# Patient Record
Sex: FEMALE | Race: OTHER | HISPANIC OR LATINO | Employment: FULL TIME | URBAN - METROPOLITAN AREA
[De-identification: names, ages, dates, MRNs, and addresses within clinical notes are randomized per-mention and may not be internally consistent; named-entity substitution may affect disease eponyms.]

---

## 2018-03-15 ENCOUNTER — OFFICE VISIT (OUTPATIENT)
Dept: OBSTETRICS AND GYNECOLOGY | Facility: CLINIC | Age: 23
End: 2018-03-15
Payer: COMMERCIAL

## 2018-03-15 ENCOUNTER — PATIENT MESSAGE (OUTPATIENT)
Dept: OBSTETRICS AND GYNECOLOGY | Facility: CLINIC | Age: 23
End: 2018-03-15

## 2018-03-15 VITALS
DIASTOLIC BLOOD PRESSURE: 62 MMHG | BODY MASS INDEX: 21.62 KG/M2 | SYSTOLIC BLOOD PRESSURE: 98 MMHG | WEIGHT: 114.5 LBS | HEIGHT: 61 IN

## 2018-03-15 DIAGNOSIS — Z11.3 SCREENING EXAMINATION FOR STD (SEXUALLY TRANSMITTED DISEASE): ICD-10-CM

## 2018-03-15 DIAGNOSIS — Z30.9 ENCOUNTER FOR CONTRACEPTIVE MANAGEMENT, UNSPECIFIED TYPE: Primary | ICD-10-CM

## 2018-03-15 DIAGNOSIS — Z12.4 ENCOUNTER FOR PAPANICOLAOU SMEAR FOR CERVICAL CANCER SCREENING: ICD-10-CM

## 2018-03-15 DIAGNOSIS — N63.0 BREAST LUMP IN FEMALE: ICD-10-CM

## 2018-03-15 DIAGNOSIS — Z01.419 WELL WOMAN EXAM: ICD-10-CM

## 2018-03-15 LAB
B-HCG UR QL: NEGATIVE
CTP QC/QA: YES

## 2018-03-15 PROCEDURE — 88175 CYTOPATH C/V AUTO FLUID REDO: CPT | Performed by: PATHOLOGY

## 2018-03-15 PROCEDURE — 87624 HPV HI-RISK TYP POOLED RSLT: CPT

## 2018-03-15 PROCEDURE — 88141 CYTOPATH C/V INTERPRET: CPT | Mod: ,,, | Performed by: PATHOLOGY

## 2018-03-15 PROCEDURE — 87491 CHLMYD TRACH DNA AMP PROBE: CPT

## 2018-03-15 PROCEDURE — 99999 PR PBB SHADOW E&M-NEW PATIENT-LVL III: CPT | Mod: PBBFAC,,, | Performed by: STUDENT IN AN ORGANIZED HEALTH CARE EDUCATION/TRAINING PROGRAM

## 2018-03-15 PROCEDURE — 99385 PREV VISIT NEW AGE 18-39: CPT | Mod: S$GLB,,, | Performed by: STUDENT IN AN ORGANIZED HEALTH CARE EDUCATION/TRAINING PROGRAM

## 2018-03-15 PROCEDURE — 87480 CANDIDA DNA DIR PROBE: CPT

## 2018-03-15 PROCEDURE — 81025 URINE PREGNANCY TEST: CPT | Mod: S$GLB,,, | Performed by: STUDENT IN AN ORGANIZED HEALTH CARE EDUCATION/TRAINING PROGRAM

## 2018-03-15 NOTE — PROGRESS NOTES
Chief Complaint: Well Woman Exam     HPI:      Juan Carrizales is a 22 y.o.  who presents today for well woman exam.  LMP: Patient's last menstrual period was 2018 (approximate).  No issues, problems, or complaints. Specifically, patient denies abnormal vaginal bleeding, discharge, pelvic pain, urinary problems, or changes in appetite. Ms. Carrizales is currently sexually active with a single male partner. She is currently using no method for contraception. She would like STD screening today (vaginal swab).    Previous Pap: No result found     GYN Hx:  15/R/5 days.  Denies any history of abnormal pap smears or STIs.  Occasional dysmenorrhea relieved with advil.  Gardasil:  Received in .   OB History      Para Term  AB Living    0 0 0 0 0 0    SAB TAB Ectopic Multiple Live Births    0 0 0 0 0        History reviewed. No pertinent past medical history.  History reviewed. No pertinent surgical history.  Social History     Social History    Marital status: Single     Spouse name: N/A    Number of children: N/A    Years of education: N/A     Occupational History    Not on file.     Social History Main Topics    Smoking status: Former Smoker    Smokeless tobacco: Never Used    Alcohol use Yes    Drug use: No    Sexual activity: Yes     Partners: Male     Birth control/ protection: Condom     Other Topics Concern    Not on file     Social History Narrative    No narrative on file     Family History   Problem Relation Age of Onset    Colon cancer Mother     Breast cancer Neg Hx     Ovarian cancer Neg Hx     Mother diagnosed with colon cancer in early 50s    ROS:     GENERAL: Denies unintentional weight gain or weight loss. Feeling well overall.   SKIN: Denies rash or lesions.   HEENT: Denies headaches, or vision changes.   CARDIOVASCULAR: Denies palpitations or chest pain.   RESPIRATORY: Denies shortness of breath or dyspnea on exertion.  BREASTS: Denies pain, lumps, or  "nipple discharge.   ABDOMEN: Denies abdominal pain, constipation, diarrhea, nausea, vomiting, change in appetite.  URINARY: Denies frequency, dysuria, hematuria.  NEUROLOGIC: Denies syncope or weakness.   PSYCHIATRIC: Denies depression, anxiety or mood swings.    Physical Exam:      PHYSICAL EXAM:  BP 98/62   Ht 5' 1" (1.549 m)   Wt 51.9 kg (114 lb 8.5 oz)   LMP 03/09/2018 (Approximate)   BMI 21.64 kg/m²   Body mass index is 21.64 kg/m².     APPEARANCE: Well nourished, well developed, in no acute distress.  PSYCH: Appropriate mood and affect.  SKIN: No acne or hirsutism.  NECK: Neck symmetric without masses or thyromegaly.  NODES: No inguinal, axillary, or supraclavicular lymph node enlargement.  CHEST: Normal respiratory effort.    CARDIOVASCULAR:  Regular rate and rhythm.  LUNGS:  Clear to auscultation bilaterally.  BREASTS: Symmetrical, no skin changes or visible lesions.  R breast with 3 x 2 cm mass at 11 o clock, mobile, mildly TTP.  No other palpable masses or nipple discharge bilaterally.  ABDOMEN: Soft.  No tenderness or masses.    PELVIC: Normal external genitalia without lesions.  Normal hair distribution.  Adequate perineal body, normal urethral meatus.  Vagina moist and well rugated without lesions or discharge.  Cervix pink, without lesions, discharge or tenderness.  No significant cystocele or rectocele.  Bimanual exam shows uterus to be normal size, regular, mobile and nontender.  Adnexa without masses or tenderness.    EXTREMITIES: No edema.  No tenderness to palpation.    Labs:  UPT negative    Assessment/Plan:     22 y.o. G0 here for well woman exam and contraception counseling    Encounter for contraceptive management, unspecified type  -     POCT Urine Pregnancy    Encounter for Papanicolaou smear for cervical cancer screening  -     Liquid-based pap smear, screening    Screening examination for STD (sexually transmitted disease)  -     C. trachomatis/N. gonorrhoeae by AMP DNA " Cervicovaginal  -     Vaginosis Screen by DNA Probe    Well woman exam  -     Liquid-based pap smear, screening  -     C. trachomatis/N. gonorrhoeae by AMP DNA Cervicovaginal  -     Vaginosis Screen by DNA Probe  -     POCT Urine Pregnancy          Counselin.  Annual exam performed today without difficulty.  Patient was counseled today on current ASCCP pap guidelines, the recommendation for yearly pelvic exams, healthy diet and exercise routines, breast self awareness.  Pap smear collected - will follow up results.  All questions answered.    2.  Contraception:  The risks of, benefits of, and alternatives of various forms of contraception were discussed at this visit. After a discussion of the R/B/A of fertility awareness, barrier contraception, hormonal pills, injections, patches, rings, hormonal and non-hormonal IUDs, and the subdermal implant, all of  questions were answered, and she has opted for Nexplanon.  Device ordered today.  Also would like to proceed with trial of OCPs while awaiting Nexplanon.  Today's discussion included:  * When to initiate pills.  * The need for regular compliance to ensure adequate contraceptive effect.  * What to do in event of a missed pill.  * Potential minor side effects such as breakthrough spotting, nausea, breast tenderness, weight changes, acne, headaches, etc.   * Potential though less likely major side effects such as MI, stroke, and deep vein thrombosis. She has been asked to report any signs of such serious problems immediately.    * The need for a back-up form of contraception such as condoms during any cycle in which antibiotics are prescribed, and during the first cycle.   * The need for barrier contraception to prevent exposure to sexually transmitted diseases. Ms. Carrizales was clearly counseled that OCP's cannot protect her against diseases such as HIV, herpes, and others.   All questions were answered, she voiced understanding, and she wishes to take the  medication as prescribed.  3.  Breast lump:  Discussed avoidance of caffeine with patient.  R breast U/S ordered.  Will follow up results.  4.  STD testing:  GC/CT ordered.  Declines blood work.  5.  Follow up with PCP for other health maintenance.  6.  RTC in 1 month for nexplanon insertion or sooner if needed.    Use of the [facility] Patient Portal discussed and encouraged during today's visit.

## 2018-03-16 LAB
C TRACH DNA SPEC QL NAA+PROBE: NOT DETECTED
CANDIDA RRNA VAG QL PROBE: NEGATIVE
G VAGINALIS RRNA GENITAL QL PROBE: NEGATIVE
N GONORRHOEA DNA SPEC QL NAA+PROBE: NOT DETECTED
T VAGINALIS RRNA GENITAL QL PROBE: NEGATIVE

## 2018-03-26 ENCOUNTER — TELEPHONE (OUTPATIENT)
Dept: OBSTETRICS AND GYNECOLOGY | Facility: CLINIC | Age: 23
End: 2018-03-26

## 2018-03-28 LAB
HPV16 AG SPEC QL: NEGATIVE
HPV16+18+H RISK 12 DNA CVX-IMP: POSITIVE
HPV18 DNA SPEC QL NAA+PROBE: NEGATIVE

## 2018-03-29 ENCOUNTER — TELEPHONE (OUTPATIENT)
Dept: OBSTETRICS AND GYNECOLOGY | Facility: CLINIC | Age: 23
End: 2018-03-29

## 2018-03-29 NOTE — TELEPHONE ENCOUNTER
Phone call made to patient regarding pap smear results.  Voicemail left to return MD phone call.

## 2018-04-03 ENCOUNTER — TELEPHONE (OUTPATIENT)
Dept: OBSTETRICS AND GYNECOLOGY | Facility: CLINIC | Age: 23
End: 2018-04-03

## 2018-04-03 ENCOUNTER — PATIENT MESSAGE (OUTPATIENT)
Dept: OBSTETRICS AND GYNECOLOGY | Facility: CLINIC | Age: 23
End: 2018-04-03

## 2018-04-03 NOTE — TELEPHONE ENCOUNTER
Phone call again made to patient regarding pap smear, nexplanon, breast U/S.  No answer.  Voicemail left to return MD phone call.

## 2018-04-06 ENCOUNTER — PATIENT MESSAGE (OUTPATIENT)
Dept: OBSTETRICS AND GYNECOLOGY | Facility: CLINIC | Age: 23
End: 2018-04-06

## 2018-04-07 NOTE — TELEPHONE ENCOUNTER
Phone call made to patient.  Discussed HPV and prevention and immune health.  All questions answered.  Also discussed Nexplanon with patient and she desires to proceed with placement.  Will keep scheduled follow up.  All questions answered.

## 2018-04-23 ENCOUNTER — PATIENT MESSAGE (OUTPATIENT)
Dept: OBSTETRICS AND GYNECOLOGY | Facility: CLINIC | Age: 23
End: 2018-04-23